# Patient Record
Sex: FEMALE | Race: WHITE | Employment: STUDENT | URBAN - METROPOLITAN AREA
[De-identification: names, ages, dates, MRNs, and addresses within clinical notes are randomized per-mention and may not be internally consistent; named-entity substitution may affect disease eponyms.]

---

## 2023-12-18 ENCOUNTER — OFFICE VISIT (OUTPATIENT)
Dept: OBGYN CLINIC | Facility: CLINIC | Age: 23
End: 2023-12-18
Payer: COMMERCIAL

## 2023-12-18 VITALS — SYSTOLIC BLOOD PRESSURE: 124 MMHG | HEIGHT: 66 IN | DIASTOLIC BLOOD PRESSURE: 92 MMHG

## 2023-12-18 DIAGNOSIS — N93.9 ABNORMAL UTERINE BLEEDING (AUB): Primary | ICD-10-CM

## 2023-12-18 DIAGNOSIS — M62.89 HIGH-TONE PELVIC FLOOR DYSFUNCTION IN FEMALE: ICD-10-CM

## 2023-12-18 DIAGNOSIS — N94.10 DYSPAREUNIA IN FEMALE: ICD-10-CM

## 2023-12-18 PROCEDURE — 87591 N.GONORRHOEAE DNA AMP PROB: CPT | Performed by: OBSTETRICS & GYNECOLOGY

## 2023-12-18 PROCEDURE — 87491 CHLMYD TRACH DNA AMP PROBE: CPT | Performed by: OBSTETRICS & GYNECOLOGY

## 2023-12-18 RX ORDER — ATOMOXETINE 80 MG/1
CAPSULE ORAL
COMMUNITY
Start: 2022-08-11

## 2023-12-18 RX ORDER — SPIRONOLACTONE 50 MG/1
1 TABLET, FILM COATED ORAL AS DIRECTED
COMMUNITY
Start: 2020-12-11

## 2023-12-18 RX ORDER — ALBUTEROL SULFATE 90 UG/1
2 AEROSOL, METERED RESPIRATORY (INHALATION) EVERY 4 HOURS PRN
COMMUNITY
Start: 2022-12-20

## 2023-12-18 RX ORDER — DULOXETIN HYDROCHLORIDE 60 MG/1
60 CAPSULE, DELAYED RELEASE ORAL DAILY
COMMUNITY
Start: 2022-08-01

## 2023-12-18 RX ORDER — ATOMOXETINE 40 MG/1
CAPSULE ORAL
COMMUNITY
Start: 2023-11-10

## 2023-12-18 NOTE — PROGRESS NOTES
New Patient GYN History and Physical  [facility]    CHIEF COMPLAINT:    Chief Complaint   Patient presents with    IUD     Pt states she got IUD inserted  or  wants to discuss the side effects, states she was bleeding up until yesterday and also placement of IUD      HISTORY OF PRESENT ILLNESS:   Isabel Farley is a 26year old female [facility]  who presents for    Iud check. Mirena inserted over the summer to address bleeding. Had bleeding that would last about 2 weeks (very heavy) friends would tell her that her face would go pale  Painful cramps, would soak through a super tampon in less than an hour. Started birth control pills as a hormone stabilizer - freshman year of college. Wsa 2 weeks on bleeding, 2 weeks og. These helped a little, but was still bleeding over a week and half, heavy for 5-6 days. Tried taking it continuously and was spotting. So would take the placebos every 3 months or so. And would bleeding for a month. So wanted to try an IUD. Has gained a bunch of weight - doesn't fit into any clothes (stomach and breasts also.)  Is still spotting a lot with bleeding and cramping, so feels like she needs something to catch blood all the time. Some pain with sex and bleeding after sex. This would happen before the IUD was there. First gyn retired, so another gyn inserted the IUD. Asked about sterilization options.         PAST MEDICAL HISTORY:   Past Medical History:   Diagnosis Date    Acne     ADHD     Depression         PAST SURGICAL HISTORY:   Past Surgical History:   Procedure Laterality Date    [place] teeth removed          PAST OB HISTORY:  OB History    Para Term  AB Living   0 0 0 0 0 0   SAB IAB Ectopic Multiple Live Births   0 0 0 0 0       CURRENT MEDICATIONS:      Current Outpatient Medications:     spironolactone 50 MG Oral Tab, Take 1 tablet (50 mg total) by mouth As Directed., Disp: , Rfl:     DULoxetine 60 MG Oral Cap DR Particles, Take 1 capsule (60 mg total) by mouth daily. , Disp: , Rfl:     Atomoxetine HCl 80 MG Oral Cap, , Disp: , Rfl:     atomoxetine 40 MG Oral Cap, TAKE 1 CAPSULE BY MOUTH EVERY DAY FOR 1 WEEK, THEN TAKE 2 CAPSULES DAILY, Disp: , Rfl:     albuterol 108 (90 Base) MCG/ACT Inhalation Aero Soln, Inhale 2 puffs into the lungs every 4 (four) hours as needed. , Disp: , Rfl:     Acetaminophen (MIDOL OR), Take by mouth As Directed., Disp: , Rfl:     ALLERGIES:  Allergies   Allergen Reactions    Penicillins HIVES and RASH       SOCIAL HISTORY:  Social History     Socioeconomic History    Marital status: Single   Tobacco Use    Smoking status: Never    Smokeless tobacco: Never   Substance and Sexual Activity    Alcohol use: Yes     Comment: socially couple times per month    Drug use: Never    Sexual activity: Yes     Birth control/protection: I.U.D. Other Topics Concern    Blood Transfusions No       FAMILY HISTORY:  Family History   Problem Relation Age of Onset    Other (myasthenia gravis) Father     Other (epithelial atypia in her breast) Mother     Uterine Cancer Maternal Grandmother     Pancreatic Cancer Maternal Grandmother     Breast Cancer Paternal Grandfather      ASSESSMENTS:  PHYSICAL EXAM:   No LMP recorded. (Menstrual status: IUD - Intrauterine Device).    Vitals:    12/18/23 1515   BP: (!) 124/92   Height: 66\"     CONSTITUTIONAL: Awake, alert, cooperative, no apparent distress, and appears stated age   NECK: Supple, symmetrical, trachea midline, no adenopathy, thyroid symmetric, not enlarged and no tenderness  LUNGS: No excess work of breathing  ABDOMEN: Soft, non-distended, non-tender, no masses palpated    GENITAL/URINARY:    External Genitalia:  General appearance; normal, Hair distribution; normal, Lesions absent   Urethral Meatus:  Lesions absent, Prolapse absent  Bladder:  Tenderness absent, Cystocele absent  Vagina:  Discharge absent, Lesions absent, Pelvic support normal; Strength 4/5 with poor relaxation, pain b/l OI worse with abduction R>L  Cervix:  Lesions absent, Discharge absent, Tenderness absent; IUD strings visible approx 1.5 cm  Uterus:  Size normal, Masses absent, Tenderness absent  Adnexa: Masses absent, Tenderness absent  Anus/Perineum:  Lesions absent    MUSCULOSKELETAL: There is no redness, warmth, or swelling of the joints. Tone is normal.  NEUROLOGIC: Patient is awake, alert and oriented to name, place and time. Casual gait is normal.  SKIN: no bruising or bleeding and no rashes  PSYCHIATRIC: Behavior:  Appropriate  Mood:  appropriate  ASSESSMENT AND PLAN:  1. Abnormal uterine bleeding (AUB)  - could simply be related to IUD. Might be how her body repsonds to hormonal treatment. Recommend US to eval endometrium for polyps/fibroids. And STI testing.  - Pelvic US Complete GYNE Only [84590/81432]; Future  - Chlamydia/Gc Amplification; Future  - Chlamydia/Gc Amplification    2. Dyspareunia in female  - likely related to high tone, however r/o STI.  - Chlamydia/Gc Amplification; Future  - Chlamydia/Gc Amplification    3. High-tone pelvic floor dysfunction in female  - Physical Therapy Referral - External  - Chlamydia/Gc Amplification;  Future  - Chlamydia/Gc Amplification     follow up as needed  Meagan Josue DO

## 2023-12-19 PROBLEM — F32.A DEPRESSION: Status: ACTIVE | Noted: 2023-12-19

## 2023-12-19 PROBLEM — F90.9 ADHD: Status: ACTIVE | Noted: 2023-12-19

## 2023-12-19 LAB
C TRACH DNA SPEC QL NAA+PROBE: NEGATIVE
N GONORRHOEA DNA SPEC QL NAA+PROBE: NEGATIVE

## 2023-12-20 ENCOUNTER — ULTRASOUND ENCOUNTER (OUTPATIENT)
Dept: OBGYN CLINIC | Facility: CLINIC | Age: 23
End: 2023-12-20
Payer: COMMERCIAL

## 2023-12-20 DIAGNOSIS — N93.9 ABNORMAL UTERINE BLEEDING (AUB): ICD-10-CM

## 2023-12-20 PROCEDURE — 76830 TRANSVAGINAL US NON-OB: CPT | Performed by: OBSTETRICS & GYNECOLOGY

## 2023-12-20 PROCEDURE — 76856 US EXAM PELVIC COMPLETE: CPT | Performed by: OBSTETRICS & GYNECOLOGY

## 2024-04-03 ENCOUNTER — PATIENT MESSAGE (OUTPATIENT)
Dept: OBGYN CLINIC | Facility: CLINIC | Age: 24
End: 2024-04-03

## 2024-04-03 DIAGNOSIS — N93.9 ABNORMAL UTERINE BLEEDING (AUB): Primary | ICD-10-CM

## 2024-04-03 NOTE — TELEPHONE ENCOUNTER
Jocelyn Dong, DO  12/21/2023  3:46 PM CST       Harshad Angulo,  The ultrasound shows a normal uterus with normal placed IUD.  The endometrial cavity appears normal with no polyp or fibroid. So the bleeding pattern is probably your body's response to the IUD.     There is a cyst in your right ovary, it is a bubble with some blood in it. These typically go away if we give them enough time, so I usually recommend repeating an ultrasound in 2-4 months to see if it has resolved or if it has gotten bigger.     Please call the office with any questions or concern [phone].  ~Dr. Dong         From: Adele Esposito  To: Jocelyn Dong  Sent: 4/3/2024  8:58 AM CDT  Subject: Constant bleeding/spotting    Hi Dr Dong,    I have been experiencing almost constant spotting/light bleeding for going on three weeks now. It’s seems a little like the beginning/end of a period with colored discharge and cramping and such. I also have noticed if I get aroused it gets heavier and cramping gets worse. I essentially have to use a tampon every day to make sure I don’t bleed onto anything. Do you have any advise? Could this be caused by the cyst we found on my ovary? What do you suggest moving forward? This is incredibly frustrating for me cause I feel like I’m in constant pain or fear of bleeding. I have an appointment scheduled with you this summer but wanted to reach out and get advise.     Kind regards,  Hilda Esposito

## 2024-04-05 NOTE — TELEPHONE ENCOUNTER
Reagan Angulo,      An order for pelvic ultrasound has been placed.  Please call Dr. Dong's office to schedule.     Any concerns or additional questions, please call us at .     Thanks,

## 2024-05-28 ENCOUNTER — HOSPITAL ENCOUNTER (OUTPATIENT)
Dept: ULTRASOUND IMAGING | Age: 24
Discharge: HOME | End: 2024-05-28
Attending: OBSTETRICS & GYNECOLOGY

## 2024-05-28 DIAGNOSIS — N93.9 ABNORMAL UTERINE BLEEDING: ICD-10-CM

## 2024-05-28 PROCEDURE — 76856 US EXAM PELVIC COMPLETE: CPT | Performed by: OBSTETRICS & GYNECOLOGY

## 2024-05-28 PROCEDURE — 76830 TRANSVAGINAL US NON-OB: CPT | Performed by: OBSTETRICS & GYNECOLOGY

## 2024-05-30 ENCOUNTER — OFFICE VISIT (OUTPATIENT)
Dept: OBGYN CLINIC | Facility: CLINIC | Age: 24
End: 2024-05-30

## 2024-05-30 VITALS
SYSTOLIC BLOOD PRESSURE: 110 MMHG | HEIGHT: 68 IN | DIASTOLIC BLOOD PRESSURE: 80 MMHG | BODY MASS INDEX: 28.95 KG/M2 | WEIGHT: 191 LBS

## 2024-05-30 DIAGNOSIS — Z30.432 ENCOUNTER FOR IUD REMOVAL: ICD-10-CM

## 2024-05-30 DIAGNOSIS — N83.201 RIGHT OVARIAN CYST: ICD-10-CM

## 2024-05-30 DIAGNOSIS — Z12.4 PAP SMEAR FOR CERVICAL CANCER SCREENING: ICD-10-CM

## 2024-05-30 DIAGNOSIS — Z97.5 BREAKTHROUGH BLEEDING ASSOCIATED WITH INTRAUTERINE DEVICE (IUD): ICD-10-CM

## 2024-05-30 DIAGNOSIS — N92.1 BREAKTHROUGH BLEEDING ASSOCIATED WITH INTRAUTERINE DEVICE (IUD): ICD-10-CM

## 2024-05-30 DIAGNOSIS — M62.89 HIGH-TONE PELVIC FLOOR DYSFUNCTION IN FEMALE: Primary | ICD-10-CM

## 2024-05-30 PROCEDURE — 99214 OFFICE O/P EST MOD 30 MIN: CPT | Performed by: OBSTETRICS & GYNECOLOGY

## 2024-05-30 PROCEDURE — 58301 REMOVE INTRAUTERINE DEVICE: CPT | Performed by: OBSTETRICS & GYNECOLOGY

## 2024-05-30 PROCEDURE — 88175 CYTOPATH C/V AUTO FLUID REDO: CPT | Performed by: OBSTETRICS & GYNECOLOGY

## 2024-05-30 RX ORDER — ALBUTEROL SULFATE 90 UG/1
2 AEROSOL, METERED RESPIRATORY (INHALATION) EVERY 4 HOURS PRN
Refills: 0 | Status: CANCELLED | OUTPATIENT
Start: 2024-05-30

## 2024-05-30 RX ORDER — TRANEXAMIC ACID 650 MG/1
1300 TABLET ORAL 3 TIMES DAILY
Qty: 30 TABLET | Refills: 10 | Status: SHIPPED | OUTPATIENT
Start: 2024-05-30

## 2024-05-30 NOTE — PROGRESS NOTES
RETURN GYN OFFICE VISIT  [facility]    CHIEF COMPLAINT:    Chief Complaint   Patient presents with    Menstrual Problem     Pt states she's on IUD and is bleeding and spotting for a month and a half and then will stop and start again. Pt states IUD inserted 2023       HISTORY OF PRESENT ILLNESS:    ALINA is a 24 year old female  here for   Abnormal bleeding    Spotting all the time - will stop for several days to a week, then start again.  Spotting starts after sex.    Before the IUD - was on OCP - having heavy bleeding with the pill  Was on marisol.    Needs pap smear.    Still having pain with sex, did not do PT we discussed last time.    Questions about ovarian cyst on her recent US.    REVIEW OF SYSTEMS:   CONSTITUTIONAL:  negative for fevers, chills, sweats and fatigue  GASTROINTESTINAL:  negative for nausea, vomiting, blood in stool, constipation, diarrhea and abdominal pain  GENITOURINARY: negative for no dysuria, urgency or frequency; no urinary incontinence; no hematuria  SKIN:  negative for  rash, skin lesion and pruritus  ENDOCRINE:  negative for acne, fatigue, weight gain and weight loss  BEHAVIOR/PSYCH:  negative for depressed mood, anhedonia and anxiety    CURRENT MEDICATIONS:      Current Outpatient Medications:     tranexamic acid 650 MG Oral Tab, Take 2 tablets (1,300 mg total) by mouth in the morning, at noon, and at bedtime., Disp: 30 tablet, Rfl: 10    DULoxetine 60 MG Oral Cap DR Particles, Take 1 capsule (60 mg total) by mouth daily., Disp: 90 capsule, Rfl: 3    atomoxetine 40 MG Oral Cap, 2 tabs once a day by mouth, Disp: 180 capsule, Rfl: 3    spironolactone 50 MG Oral Tab, Take 1 tablet (50 mg total) by mouth As Directed., Disp: , Rfl:     albuterol 108 (90 Base) MCG/ACT Inhalation Aero Soln, Inhale 2 puffs into the lungs every 4 (four) hours as needed., Disp: , Rfl:     Acetaminophen (MIDOL OR), Take by mouth As Directed. (Patient not taking: Reported on 2024), Disp: , Rfl:      PAST MEDICAL, SOCIAL AND FAMILY HISTORY:    Past Medical History:    Acne    ADHD    Depression     Past Surgical History:   Procedure Laterality Date    [place] teeth removed       Family History   Problem Relation Age of Onset    Other (myasthenia gravis) Father     Other (epithelial atypia in her breast) Mother     Uterine Cancer Maternal Grandmother     Pancreatic Cancer Maternal Grandmother     Breast Cancer Paternal Grandfather      Social History     Socioeconomic History    Marital status: Single   Tobacco Use    Smoking status: Never    Smokeless tobacco: Never   Vaping Use    Vaping status: Never Used   Substance and Sexual Activity    Alcohol use: Yes     Comment: socially couple times per month    Drug use: Never    Sexual activity: Yes     Birth control/protection: I.U.D.   Other Topics Concern    Blood Transfusions No           PHYSICAL EXAM:   No LMP recorded. (Menstrual status: IUD - Intrauterine Device).; Body mass index is 29.04 kg/m².      CONSTITUTIONAL:  Awake, alert, cooperative, no apparent distress  EYES: sclera clear and conjunctiva normal  GASTROINTESTINAL:  soft, non-distended, non-tender, no masses palpated  GENITAL/URINARY:    External Genitalia:  General appearance; normal, Hair distribution; normal, Lesions absent   Urethral Meatus:  Lesions absent, Prolapse absent  Bladder:  Tenderness absent, Cystocele absent  Vagina:  Discharge absent, Lesions absent, Pelvic support normal  Cervix:  Lesions absent, Discharge absent, Tenderness absent  Uterus:  Size normal, Masses absent, Tenderness absent  Adnexa:  Masses absent, Tenderness absent  Anus/Perineum:  Lesions absent    IUD strings grasped with uterine forceps and gentle traction was applied. The IUD was easily and completely removed.    SKIN:  No rashes  PSYCH:  Affect Normal      ASSESSMENT AND PLAN:  1. Breakthrough bleeding associated with intrauterine device (IUD)  - reviewed options: keep iud and add combined contraceptive,  remove IUD for \"hormone break\" for a bit and use TXA for heavy periods seveal months.  - if TXA works and she feels good / less bleeding, ok to continue - important to use condoms and/or VCA. If doesn't work, will need to figure some other option (another form of OCPs, patch, nuvaring, etc.)  - REMOVE INTRAUTERINE DEVICE [[id number]]    2. High-tone pelvic floor dysfunction in female  - Physical Therapy Referral - External    3. Pap smear for cervical cancer screening  - ThinPrep PAP with HPV Reflex Request; Future    4. Encounter for IUD removal  - REMOVE INTRAUTERINE DEVICE [[id number]]     5. Right ovarian cyst  - reviewed US findings. Is a different cyst than last time. No concern based on size / appearance and no need for specific f/u.        Jocelyn Dong, DO

## 2024-06-05 ENCOUNTER — LAB ENCOUNTER (OUTPATIENT)
Dept: LAB | Age: 24
End: 2024-06-05
Attending: FAMILY MEDICINE
Payer: COMMERCIAL

## 2024-06-05 ENCOUNTER — OFFICE VISIT (OUTPATIENT)
Dept: FAMILY MEDICINE CLINIC | Facility: CLINIC | Age: 24
End: 2024-06-05
Payer: COMMERCIAL

## 2024-06-05 VITALS
DIASTOLIC BLOOD PRESSURE: 79 MMHG | SYSTOLIC BLOOD PRESSURE: 115 MMHG | WEIGHT: 190.81 LBS | TEMPERATURE: 98 F | HEIGHT: 67.25 IN | HEART RATE: 88 BPM | BODY MASS INDEX: 29.6 KG/M2 | RESPIRATION RATE: 16 BRPM

## 2024-06-05 DIAGNOSIS — R09.81 NASAL CONGESTION: ICD-10-CM

## 2024-06-05 DIAGNOSIS — G89.29 CHRONIC PAIN OF LEFT ANKLE: ICD-10-CM

## 2024-06-05 DIAGNOSIS — F90.2 ATTENTION DEFICIT HYPERACTIVITY DISORDER (ADHD), COMBINED TYPE: ICD-10-CM

## 2024-06-05 DIAGNOSIS — Z00.00 ROUTINE PHYSICAL EXAMINATION: Primary | ICD-10-CM

## 2024-06-05 DIAGNOSIS — M25.572 CHRONIC PAIN OF LEFT ANKLE: ICD-10-CM

## 2024-06-05 DIAGNOSIS — F33.40 RECURRENT MAJOR DEPRESSIVE DISORDER, IN REMISSION (HCC): ICD-10-CM

## 2024-06-05 DIAGNOSIS — Z00.00 ROUTINE PHYSICAL EXAMINATION: ICD-10-CM

## 2024-06-05 LAB
ALBUMIN SERPL-MCNC: 4.9 G/DL (ref 3.2–4.8)
ALBUMIN/GLOB SERPL: 1.7 {RATIO} (ref 1–2)
ALP LIVER SERPL-CCNC: 106 U/L
ALT SERPL-CCNC: 8 U/L
ANION GAP SERPL CALC-SCNC: 9 MMOL/L (ref 0–18)
AST SERPL-CCNC: 16 U/L (ref ?–34)
BILIRUB SERPL-MCNC: 0.4 MG/DL (ref 0.3–1.2)
BUN BLD-MCNC: 9 MG/DL (ref 9–23)
BUN/CREAT SERPL: 11.1 (ref 10–20)
CALCIUM BLD-MCNC: 9.9 MG/DL (ref 8.7–10.4)
CHLORIDE SERPL-SCNC: 107 MMOL/L (ref 98–112)
CHOLEST SERPL-MCNC: 151 MG/DL (ref ?–200)
CO2 SERPL-SCNC: 25 MMOL/L (ref 21–32)
CREAT BLD-MCNC: 0.81 MG/DL
DEPRECATED RDW RBC AUTO: 41.8 FL (ref 35.1–46.3)
EGFRCR SERPLBLD CKD-EPI 2021: 105 ML/MIN/1.73M2 (ref 60–?)
ERYTHROCYTE [DISTWIDTH] IN BLOOD BY AUTOMATED COUNT: 13 % (ref 11–15)
EST. AVERAGE GLUCOSE BLD GHB EST-MCNC: 100 MG/DL (ref 68–126)
FASTING PATIENT LIPID ANSWER: YES
FASTING STATUS PATIENT QL REPORTED: YES
GLOBULIN PLAS-MCNC: 2.9 G/DL (ref 2–3.5)
GLUCOSE BLD-MCNC: 76 MG/DL (ref 70–99)
HBA1C MFR BLD: 5.1 % (ref ?–5.7)
HCT VFR BLD AUTO: 38.1 %
HDLC SERPL-MCNC: 45 MG/DL (ref 40–59)
HGB BLD-MCNC: 12.5 G/DL
LDLC SERPL CALC-MCNC: 92 MG/DL (ref ?–100)
MCH RBC QN AUTO: 29.2 PG (ref 26–34)
MCHC RBC AUTO-ENTMCNC: 32.8 G/DL (ref 31–37)
MCV RBC AUTO: 89 FL
NONHDLC SERPL-MCNC: 106 MG/DL (ref ?–130)
OSMOLALITY SERPL CALC.SUM OF ELEC: 289 MOSM/KG (ref 275–295)
PLATELET # BLD AUTO: 492 10(3)UL (ref 150–450)
POTASSIUM SERPL-SCNC: 4.4 MMOL/L (ref 3.5–5.1)
PROT SERPL-MCNC: 7.8 G/DL (ref 5.7–8.2)
RBC # BLD AUTO: 4.28 X10(6)UL
SODIUM SERPL-SCNC: 141 MMOL/L (ref 136–145)
TRIGL SERPL-MCNC: 72 MG/DL (ref 30–149)
TSI SER-ACNC: 1.2 MIU/ML (ref 0.55–4.78)
VIT D+METAB SERPL-MCNC: 29.6 NG/ML (ref 30–100)
VLDLC SERPL CALC-MCNC: 12 MG/DL (ref 0–30)
WBC # BLD AUTO: 8.2 X10(3) UL (ref 4–11)

## 2024-06-05 PROCEDURE — 83036 HEMOGLOBIN GLYCOSYLATED A1C: CPT | Performed by: FAMILY MEDICINE

## 2024-06-05 PROCEDURE — 82306 VITAMIN D 25 HYDROXY: CPT

## 2024-06-05 PROCEDURE — 80061 LIPID PANEL: CPT | Performed by: FAMILY MEDICINE

## 2024-06-05 PROCEDURE — 84443 ASSAY THYROID STIM HORMONE: CPT | Performed by: FAMILY MEDICINE

## 2024-06-05 PROCEDURE — 36415 COLL VENOUS BLD VENIPUNCTURE: CPT | Performed by: FAMILY MEDICINE

## 2024-06-05 PROCEDURE — 85027 COMPLETE CBC AUTOMATED: CPT | Performed by: FAMILY MEDICINE

## 2024-06-05 PROCEDURE — 99395 PREV VISIT EST AGE 18-39: CPT | Performed by: FAMILY MEDICINE

## 2024-06-05 PROCEDURE — 80053 COMPREHEN METABOLIC PANEL: CPT | Performed by: FAMILY MEDICINE

## 2024-06-05 RX ORDER — ALBUTEROL SULFATE 90 UG/1
2 AEROSOL, METERED RESPIRATORY (INHALATION) EVERY 4 HOURS PRN
Qty: 1 EACH | Refills: 3 | Status: SHIPPED | OUTPATIENT
Start: 2024-06-05

## 2024-06-05 RX ORDER — GLYCOPYRROLATE 2 MG/1
2 TABLET ORAL DAILY
COMMUNITY
Start: 2024-05-29

## 2024-06-05 RX ORDER — TACROLIMUS 0.3 MG/G
OINTMENT TOPICAL
COMMUNITY
Start: 2024-05-28

## 2024-06-05 NOTE — PROGRESS NOTES
HPI:  24 yr old female who presents for physical. Single.  No children.  Goes to [employer] [place]. Working full-time for co-op.  Majoring in mechanical engineering. Exercises regularly.  Eats healthy.     Has history of depression and ADHD. Was prescribed medication by Dr. Cano. Originally diagnosed about 2 years ago. Pt is on 60mg of Duloxetine.  Works well.  Also on Atomoxetine 40mg which works some of the time.  Had Genome study done which said stimulants would not be affected. Does not take it daily. Has psychologist.     Has chronic sweating.  On Glycopyrrolate. Started last night by Dermatologist.  Sees Derm for acne as well.     Sees Gynecology. Has heavy, irregular periods and is going to try tranexamic acid during periods.     No diagnosis of asthma but uses Albuterol for exercise.     Has been snore more recently.  Feels like she does not get enough air through nose. Feels congested chronically.  Gets sick about every 3 weeks. Takes Sudafed or Claritin.  Does not seem to help.     Left ankle hurts when she inverts it. No injury.  Does not feel like she has full range of motion.     PMHx: reviewed, see chart  PSHx: reviewed, see chart  All: Penicillins   Meds: see chart    ROS:   Allergic/Immuno:  Negative for environmental allergies and food allergies  Cardiovascular:  Negative for chest pain and irregular heartbeat/palpitations  Constitutional:  Negative for decreased activity, fever, irritability and lethargy  Endocrine:  Negative for abnormal sleep patterns, increased activity, polydipsia and polyphagia  ENMT:  Negative for ear drainage, negative for eye discharge and vision loss. Positive for nasal congestion, snoring.   Gastrointestinal:  Negative for abdominal pain, constipation, decreased appetite, diarrhea and vomiting  Genitourinary:  Negative for dysuria and hematuria  Hema/Lymph:  Negative for easy bleeding and easy bruising  Integumentary:  Negative for pruritus and  rash  Musculoskeletal:  chronic left ankle pain  Neurological:  Negative for gait disturbance  Psychiatric:  Negative for inappropriate interaction and psychiatric symptoms    PE:  /79   Pulse 88   Temp 98.4 °F (36.9 °C) (Temporal)   Resp 16   Ht 5' 7.25\" (1.708 m)   Wt 190 lb 12.8 oz (86.5 kg)   LMP 06/03/2024   BMI 29.66 kg/m²   Gen:  Well-nourished.  No distress.  HEENT: Conjunctive clear.  Davy ear canals clear.  Davy TMs intact with good landmarks noted. Oral mucous membrane moist.  Normal lips, teeth, and gums.  Oropharynx normal.  Neck supple.  Good ROM.  No LAD.  Thyroid normal.  CV:  Regular rate and rhythm; no murmurs  Lungs:  Clear to ausculation; good aeration               No wheezes, rales or rhonchi  Abd: soft, non-tender, non-distended          Normal bowel sounds; no masses          No hepatosplenomegaly  Breasts:  Normal appearance bilateral.  No masses or lesions noted.  Normal nipples bilateral.  No nipple discharge noted.  Normal lymph nodes.  Extremities: No cyanosis, clubbing, edema.  Pedal pulses 2+ davy.  MSK:  No abnormalities.  Skin: pyogenic granuloma noted to left earlobe    A/P:  Encounter Diagnoses   Name Primary?    Routine physical examination    Encouraged exercise and healthy diet.  Follows with Derm and Gyne. Labs done.    Yes    Recurrent major depressive disorder, in remission (HCC)    Stable on Duloxetine.  Will refill.        Attention deficit hyperactivity disorder (ADHD), combined type    Stable on Atomoxetine.  Will refill and see patient every 6 months.        Nasal congestion    Worsening.  Advised daily anti-histamine and Flonase.  Refer to ENT       Chronic pain of left ankle    Refer for physical therapy.         Colleen Weiler, DO

## 2024-06-20 ENCOUNTER — OFFICE VISIT (OUTPATIENT)
Dept: OTOLARYNGOLOGY | Facility: CLINIC | Age: 24
End: 2024-06-20

## 2024-06-20 DIAGNOSIS — R40.0 DAYTIME SLEEPINESS: ICD-10-CM

## 2024-06-20 DIAGNOSIS — R06.83 SNORING: Primary | ICD-10-CM

## 2024-06-20 PROCEDURE — 99203 OFFICE O/P NEW LOW 30 MIN: CPT | Performed by: STUDENT IN AN ORGANIZED HEALTH CARE EDUCATION/TRAINING PROGRAM

## 2024-06-20 PROCEDURE — 31231 NASAL ENDOSCOPY DX: CPT | Performed by: STUDENT IN AN ORGANIZED HEALTH CARE EDUCATION/TRAINING PROGRAM

## 2024-06-20 RX ORDER — AZELASTINE 1 MG/ML
1 SPRAY, METERED NASAL 2 TIMES DAILY
Qty: 30 ML | Refills: 3 | Status: SHIPPED | OUTPATIENT
Start: 2024-06-20

## 2024-06-20 RX ORDER — CETIRIZINE HYDROCHLORIDE 1 MG/ML
5 SOLUTION ORAL DAILY
COMMUNITY

## 2024-06-20 NOTE — PROGRESS NOTES
[facility]  OTOLARYNGOLOGY - HEAD & NECK SURGERY    6/20/2024     Reason for Consultation:   Nasal congestion, snoring    History of Present Illness:   Patient is a pleasant 24 year old female who is being seen for snoring which she states has gotten worse recently.  She is also been having difficulty with breathing through the nose for many years.  She states that she has been getting sick every 2 to 3 weeks.  She has not been tested for allergies but states that she does have allergy symptoms.  She is currently taking Xyzal, and Flonase once daily.  She has not been evaluated by an allergist previously.  She has not had any previous sleep study.  She does state that she has daytime sleepiness and that her snoring does tend to wake her up at night.  She has had no history of nasal surgery.  She denies any facial pressure or pain in between her illnesses.    Past Medical History  Past Medical History:    Acne    ADHD    Depression       Past Surgical History  Past Surgical History:   Procedure Laterality Date    [place] teeth removed         Family History  Family History   Problem Relation Age of Onset    Other (myasthenia gravis) Father     Other (epithelial atypia in her breast) Mother     Uterine Cancer Maternal Grandmother     Pancreatic Cancer Maternal Grandmother     Breast Cancer Paternal Grandfather        Social History  Pediatric History   Patient Parents    Nany Esposito (Mother)     Other Topics Concern     Service Not Asked    Blood Transfusions No    Caffeine Concern Not Asked    Occupational Exposure Not Asked    Hobby Hazards Not Asked    Sleep Concern Not Asked    Stress Concern Not Asked    Weight Concern Not Asked    Special Diet Not Asked    Back Care Not Asked    Exercise Not Asked    Bike Helmet Not Asked    Seat Belt Not Asked    Self-Exams Not Asked   Social History Narrative    Not on file           Current Medications:  Current Outpatient Medications   Medication Sig Dispense Refill     cetirizine 1 MG/ML Oral Solution Take 5 mL (5 mg total) by mouth daily.      azelastine 0.1 % Nasal Solution 1 spray by Nasal route 2 (two) times daily. 30 mL 3    glycopyrrolate 2 MG Oral Tab Take 1 tablet (2 mg total) by mouth daily. (Patient not taking: Reported on 6/20/2024)      Tacrolimus 0.03 % External Ointment APPLY TO EYE DERMATITIS TWICE A DAY X ONE MONTH (Patient not taking: Reported on 6/20/2024)      albuterol 108 (90 Base) MCG/ACT Inhalation Aero Soln Inhale 2 puffs into the lungs every 4 (four) hours as needed. (Patient not taking: Reported on 6/20/2024) 1 each 3    tranexamic acid 650 MG Oral Tab Take 2 tablets (1,300 mg total) by mouth in the morning, at noon, and at bedtime. (Patient not taking: Reported on 6/20/2024) 30 tablet 10    DULoxetine 60 MG Oral Cap DR Particles Take 1 capsule (60 mg total) by mouth daily. (Patient not taking: Reported on 6/20/2024) 90 capsule 3    atomoxetine 40 MG Oral Cap 2 tabs once a day by mouth (Patient not taking: Reported on 6/20/2024) 180 capsule 3    spironolactone 50 MG Oral Tab Take 1 tablet (50 mg total) by mouth As Directed. (Patient not taking: Reported on 6/20/2024)      Acetaminophen (MIDOL OR) Take by mouth As Directed. (Patient not taking: Reported on 6/20/2024)         Allergies  Allergies   Allergen Reactions    Penicillins HIVES and RASH       Review of Systems:   A comprehensive 10 point review of systems was completed.  Pertinent positives and negatives noted in the the HPI.    Physical Exam:   Last menstrual period 06/03/2024.    GENERAL: No acute distress, Comfortable appearing  FACE: HB 1/6, Normal Animation  HEAD: Normocephalic  EYES: EOMI, pupils equil  EARS: Bilateral Auricles Symmetric  NOSE: Nares patent bilaterally  ORAL CAVITY: Tongue mobile, Oropharynx clear, Floor of mouth clear, Posterior oropharynx normal  NECK: No palpable lymphadenopathy, thyroid not palpable, nontender    PROCEDURE: BILATERAL RIGID NASAL ENDOSCOPY  Bilateral  rigid nasal endoscopy (46785) was performed. Verbal consent was obtained from the patient to proceed with rigid nasal endoscopy. The nasal cavity was decongested and topically anesthetized with a combination of Oxymetazoline and 4% Lidocaine. A rigid 4mm 30 degree nasal endoscope connected to a high-definition endoscopy system was used to examine both nasal cavities. Digital photos and/or videos of relevant exam findings were obtained. The inferior meatus, inferior turbinate, nasopharynx, middle meatus, middle turbinate, superior meatus, superior turbinate, and sphenoethmoidal recess were examined bilaterally and deemed to be normal, with any exceptions as noted below. At the completion of the procedure the endoscope was removed. The patient tolerated the procedure well. There were no complications.    Findings: The bilateral inferior turbinates were enlarged. The Septum was deviated to the right. The middle meatus was patent bilaterally with no obvious pus drainage. There were no obvious masses or polyps noted.      Results:     Laboratory Data:  Lab Results   Component Value Date    WBC 8.2 06/05/2024    HGB 12.5 06/05/2024    HCT 38.1 06/05/2024    .0 (H) 06/05/2024    CREATSERUM 0.81 06/05/2024    BUN 9 06/05/2024     06/05/2024    K 4.4 06/05/2024     06/05/2024    CO2 25.0 06/05/2024    GLU 76 06/05/2024    CA 9.9 06/05/2024    ALB 4.9 (H) 06/05/2024    ALKPHO 106 06/05/2024    TP 7.8 06/05/2024    AST 16 06/05/2024    ALT 8 (L) 06/05/2024    TSH 1.204 06/05/2024         Imaging:  US PELVIS W EV (CPT=76856/84423)    Result Date: 5/29/2024  PROCEDURE: US PELVIS W EV (CPT=76856/79656)  COMPARISON: None.  INDICATIONS: Abnormal uterine bleeding  TECHNIQUE: Pelvic ultrasound using transabdominal and transvaginal technique.  A transvaginal scan was performed for endometrial and adnexal evaluation  FINDINGS:   UTERUS:   Retroverted uterus Measures 6.5 x 3.7 x 5.5 cm  ENDOMETRIUM: Endometrium  measures 7.6 mm.  IUD in satisfactory position within the endometrial canal MYOMETRIUM: Normal echogenicity.  No masses.   OVARIES AND ADNEXA:   RIGHT:   Measures 3.8 x 3.6 x 4.1 cm.  Dominant benign anechoic cyst measuring 3.0 x 2.8 cm.  Normal right ovarian Doppler without torsion.  LEFT:   Measures 2.8 x 1.3 x 0.9 cm.  Small subcentimeter involuting cyst  CUL-DE-SAC:   Small amount of free fluid the cul-de-sac presumed physiologic. OTHER: Negative.  Bladder appears normal.          CONCLUSION:   1. Normal retroverted uterus.  No focal intrinsic lesion. 2. Normal thickness endometrium measures 7.6 mm.  IUD in satisfactory position in the endometrium 3. Normal appearing bilateral ovaries.  Dominant 3.0 x 2.8 cm benign follicular cyst right ovary.  Normal right ovarian Doppler.  Partially involuting left ovarian cyst measures less than 1 cm. 4. Smaller free pelvic fluid presumed physiologic   Dictated by (CST): Bernard Ryan MD on 5/29/2024 at 9:27 AM     Finalized by (CST): Bernard Ryan MD on 5/29/2024 at 9:34 AM              Impression:   Allergic rhinitis  Deviated nasal septum  Bilateral inferior turbinate hypertrophy  Snoring    Recommendations:  I would like her to obtain a sleep study, and I will reach out to her with the results.  In terms of her nasal congestion I would like to start her on azelastine nasal spray to add to her Flonase regiment.  She will do 1 spray in each nostril twice daily with each spray.  We discussed septoplasty and turbinate reduction as an option if she continues to have nasal congestion despite nasal spray use    Thank you for allowing me to participate in the care of your patient.    Geraldo Ybarra,    Otolaryngology/Rhinology, Sinus, and Endoscopic Skull Base Surgery  [address] [place] [id number]  Phone [phone]  Fax [phone]  6/20/2024  8:19 AM  6/20/2024

## 2024-06-21 RX ORDER — TRANEXAMIC ACID 650 MG/1
1300 TABLET ORAL 3 TIMES DAILY
Qty: 30 TABLET | Refills: 10 | OUTPATIENT
Start: 2024-06-21

## 2024-06-23 ENCOUNTER — PATIENT MESSAGE (OUTPATIENT)
Dept: OTOLARYNGOLOGY | Facility: CLINIC | Age: 24
End: 2024-06-23

## 2024-06-24 NOTE — TELEPHONE ENCOUNTER
From: Adele Esposiot  To: Geraldo Ybarra  Sent: 6/23/2024 10:35 PM CDT  Subject: Quick update and questions.     Hi! I’ve tried to take the azrlastine hcl spray and have had visceral reactions to it. I’ve felt like I would be sick every time I tasted it as it ran down the back of my throat (it lasted up to an hour) so I don’t think that’s a great option for me. I’ve tried it a few times and have had the same reaction. I’m still taking Flonase but is there anything else I can try?     Next I have a few more questions regarding the septoplasty procedures if that is the course of action we think is best.     - I have piercings (ear, nose, and body). For this procedure do I have to take all of them out?   - After I get the stints out, how soon can I fly/travel?  - How likely is this to chip cure my issues?  - Do I go under general anesthesia or is it local numbing and I am awake? Can I choose?  - Will this likely change how often I get sick/how severe allergies are?  - Will I need to stop taking any medications for the surgery? If I’m on my cycle can I still take tranexamic acid meds?  - Would I stop doing the nasal sprays and oral meds once if I got this surgery?

## 2024-06-24 NOTE — TELEPHONE ENCOUNTER
Dr. Ybarra, see patient's message about Azelastine side effects and questions regarding septoplasty.

## 2024-06-26 ENCOUNTER — TELEPHONE (OUTPATIENT)
Dept: OBGYN CLINIC | Facility: CLINIC | Age: 24
End: 2024-06-26

## 2024-06-26 NOTE — TELEPHONE ENCOUNTER
Spoke with patient in depth regarding her concerns with how it was billed. Explained to pt it was performed at the lab and not at our office so the price and how its billed through the insurance is different despite being located at [address] [place] [address].     Provided her the number for hospital billing to see if financial aid can help assist her

## 2024-06-26 NOTE — TELEPHONE ENCOUNTER
Received a call from patient requesting to change  the coding for her ultrasound completed on 05/28/202 at [place] [address] .  Patient noted the insurance is billing more and the coding needed to be changed to reflect that she conducted the ultrasound in the office rather than on the hospital's radiology side.    This PSR inform the patient that she  schedule  the ultrasound  using her My chart but patient insist she call for the appointment .    Patient requested to speak with a manager to see if she can help .    Please advice .

## 2024-10-27 ENCOUNTER — PATIENT MESSAGE (OUTPATIENT)
Dept: FAMILY MEDICINE CLINIC | Facility: CLINIC | Age: 24
End: 2024-10-27

## 2024-11-25 DIAGNOSIS — F32.5 MAJOR DEPRESSIVE DISORDER IN FULL REMISSION, UNSPECIFIED WHETHER RECURRENT (HCC): ICD-10-CM

## 2024-11-28 RX ORDER — DULOXETIN HYDROCHLORIDE 60 MG/1
60 CAPSULE, DELAYED RELEASE ORAL DAILY
Qty: 90 CAPSULE | Refills: 3 | Status: SHIPPED | OUTPATIENT
Start: 2024-11-28

## 2024-11-28 NOTE — TELEPHONE ENCOUNTER
Refill passed per [facility] protocol.     Requested Prescriptions   Pending Prescriptions Disp Refills    DULoxetine 60 MG Oral Cap DR Particles 90 capsule 3     Sig: Take 1 capsule (60 mg total) by mouth daily.       Psychiatric Non-Scheduled (Anti-Anxiety) Passed - 11/28/2024  8:06 AM        Passed - In person appointment or virtual visit in the past 6 mos or appointment in next 3 mos     Recent Outpatient Visits              5 months ago Snoring    [facility] Geraldo Ybarra,     Office Visit    5 months ago Routine physical examination    [facility] Weiler, Colleen M, DO    Office Visit    6 months ago High-tone pelvic floor dysfunction in female    [facility] - OB/GYN Jocelyn Dong,     Office Visit    11 months ago Attention deficit hyperactivity disorder (ADHD), predominantly inattentive type    [facility] Courtney Cano MD    Office Visit    11 months ago Abnormal uterine bleeding (AUB)    [facility] - OB/GYN Jocelyn Dong, DO    Office Visit          Future Appointments         Provider Department Appt Notes    In 4 days Jocelyn Dong, DO [facility] - OB/GYN Birth control    In 4 days Weiler, Colleen M, DO [facility] Medicines                    Passed - Depression Screening completed within the past 12 months

## 2024-12-02 ENCOUNTER — OFFICE VISIT (OUTPATIENT)
Dept: OBGYN CLINIC | Facility: CLINIC | Age: 24
End: 2024-12-02
Payer: COMMERCIAL

## 2024-12-02 VITALS — SYSTOLIC BLOOD PRESSURE: 106 MMHG | DIASTOLIC BLOOD PRESSURE: 78 MMHG

## 2024-12-02 DIAGNOSIS — Z30.09 ENCOUNTER FOR COUNSELING REGARDING CONTRACEPTION: Primary | ICD-10-CM

## 2024-12-02 RX ORDER — AZELAIC ACID 0.15 G/G
GEL TOPICAL
COMMUNITY
Start: 2024-11-09

## 2024-12-02 RX ORDER — CLINDAMYCIN PHOSPHATE 10 UG/ML
LOTION TOPICAL
COMMUNITY
Start: 2024-11-09

## 2024-12-02 RX ORDER — NORETHINDRONE ACETATE AND ETHINYL ESTRADIOL 1MG-20(21)
1 KIT ORAL DAILY
Qty: 84 TABLET | Refills: 3 | Status: SHIPPED | OUTPATIENT
Start: 2024-12-02 | End: 2025-12-02

## 2024-12-02 NOTE — PROGRESS NOTES
RETURN GYN OFFICE VISIT  [facility]    CHIEF COMPLAINT:    Chief Complaint   Patient presents with    Contraception     Pt wants to f/u     Consult     Tubal ligation        HISTORY OF PRESENT ILLNESS:    ALINA is a 24 year old female  here for   F/u on birth control    Periods feel more evened out.  Haven't been super heavy, some are heavy but not like she used to have.  If lasts over a week, uses the TXA to stop the bleeding.    Is hoping to start birth control.  Was on generic marisol - was still bleeding a lot while on it.  Was skipping the placebos but would spot for a long time.  Also gained a lot of weight on OCPs and on IUD in the past - is concerned this could happen again is starting hormonal contraception.      REVIEW OF SYSTEMS:   CONSTITUTIONAL:  negative for fevers, chills, sweats and fatigue  GASTROINTESTINAL:  negative for nausea, vomiting, blood in stool, constipation, diarrhea and abdominal pain  GENITOURINARY: negative for no dysuria, urgency or frequency; no urinary incontinence; no hematuria  SKIN:  negative for  rash, skin lesion and pruritus  ENDOCRINE:  negative for acne, fatigue, weight gain and weight loss  BEHAVIOR/PSYCH:  negative for depressed mood, anhedonia and anxiety    CURRENT MEDICATIONS:      Current Outpatient Medications:     Azelaic Acid 15 % External Gel, APPLY TO CLEAN FACE DRY FACE DAILY AT BEDTIME [phone], Disp: , Rfl:     clindamycin 1 % External Lotion, APPLY TO CLEAN FACE EVERY MORNING, Disp: , Rfl:     Norethin Ace-Eth Estrad-FE (LOESTRIN FE ) 1-20 MG-MCG Oral Tab, Take 1 tablet by mouth daily., Disp: 84 tablet, Rfl: 3    DULoxetine 60 MG Oral Cap DR Particles, Take 1 capsule (60 mg total) by mouth daily., Disp: 90 capsule, Rfl: 3    cetirizine 1 MG/ML Oral Solution, Take 5 mL (5 mg total) by mouth daily. (Patient not taking: Reported on 2024), Disp: , Rfl:     azelastine 0.1 % Nasal Solution, 1 spray by Nasal route 2 (two) times daily. (Patient  not taking: Reported on 12/2/2024), Disp: 30 mL, Rfl: 3    atomoxetine 40 MG Oral Cap, 2 tabs once a day by mouth, Disp: 180 capsule, Rfl: 3    spironolactone 50 MG Oral Tab, Take 1 tablet (50 mg total) by mouth As Directed., Disp: , Rfl:     glycopyrrolate 2 MG Oral Tab, Take 1 tablet (2 mg total) by mouth daily. (Patient not taking: Reported on 6/20/2024), Disp: , Rfl:     Tacrolimus 0.03 % External Ointment, , Disp: , Rfl:     albuterol 108 (90 Base) MCG/ACT Inhalation Aero Soln, Inhale 2 puffs into the lungs every 4 (four) hours as needed., Disp: 1 each, Rfl: 3    tranexamic acid 650 MG Oral Tab, Take 2 tablets (1,300 mg total) by mouth in the morning, at noon, and at bedtime., Disp: 30 tablet, Rfl: 10    PAST MEDICAL, SOCIAL AND FAMILY HISTORY:    Past Medical History:    Acne    ADHD    Anxiety    Depression     Past Surgical History:   Procedure Laterality Date    Insert intrauterine device      Remove intrauterine device      [place] teeth removed       Family History   Problem Relation Age of Onset    Other (myasthenia gravis) Father     Other (epithelial atypia in her breast) Mother     Uterine Cancer Maternal Grandmother     Pancreatic Cancer Maternal Grandmother     Breast Cancer Paternal Grandfather      Social History     Socioeconomic History    Marital status: Single   Tobacco Use    Smoking status: Never    Smokeless tobacco: Never   Vaping Use    Vaping status: Never Used   Substance and Sexual Activity    Alcohol use: Yes     Comment: I drink socially so i dont do it every week    Drug use: Never    Sexual activity: Yes     Birth control/protection: I.U.D.   Other Topics Concern    Blood Transfusions No    Caffeine Concern No    Special Diet No    Exercise Yes    Seat Belt Yes           PHYSICAL EXAM:   Patient's last menstrual period was 11/29/2024.; There is no height or weight on file to calculate BMI.      CONSTITUTIONAL:  Awake, alert, cooperative, no apparent distress  EYES: sclera clear and  conjunctiva normal  GASTROINTESTINAL:  soft, non-distended, non-tender, no masses palpated  SKIN:  No rashes  PSYCH:  Affect Normal      ASSESSMENT AND PLAN:  1. Encounter for counseling regarding contraception  - will try loestrin. Rx sent, ok to stop if noticing weight gain, then will have to decide if wants to try another formulation or consider sterilization.  - Talked about sterilization again - not ready for it, but is still considering this as an option, especially if OCPs don't work for her.      Medications    Norethin Ace-Eth Estrad-FE (LOESTRIN FE 1/20) 1-20 MG-MCG Oral Tab     Total face to face time was 20 minutes, more than 50% of the time was spent in counseling and/or coordination of care related to above discussions..      Jocelyn Dong, DO

## 2025-01-21 ENCOUNTER — TELEPHONE (OUTPATIENT)
Dept: SLEEP CENTER | Age: 25
End: 2025-01-21

## 2025-02-03 ENCOUNTER — PATIENT MESSAGE (OUTPATIENT)
Dept: FAMILY MEDICINE CLINIC | Facility: CLINIC | Age: 25
End: 2025-02-03

## 2025-02-04 NOTE — TELEPHONE ENCOUNTER
Please see my chart. Last office visit 12/2/2024. Did not pend due to no options for 90 mgs unless we change her 60 mg daily and have her take 30 mg one in the evening.    Please advise, thanks

## 2025-02-05 RX ORDER — DULOXETIN HYDROCHLORIDE 30 MG/1
30 CAPSULE, DELAYED RELEASE ORAL DAILY
Qty: 90 CAPSULE | Refills: 0 | Status: SHIPPED | OUTPATIENT
Start: 2025-02-05

## 2025-02-05 NOTE — TELEPHONE ENCOUNTER
Usually, when we increase to 90mg, the patients take a 60mg tab and a 30mg tab daily.  Can take them at the same time. Please ask her to follow-up over Spring break

## 2025-02-06 NOTE — TELEPHONE ENCOUNTER
Dr. Weiler please advise, the patient will not be home for spring break and is asking if she can do a video call.

## 2025-02-19 ENCOUNTER — TELEPHONE (OUTPATIENT)
Dept: SLEEP CENTER | Age: 25
End: 2025-02-19

## 2025-04-15 DIAGNOSIS — F32.5 MAJOR DEPRESSIVE DISORDER IN FULL REMISSION, UNSPECIFIED WHETHER RECURRENT: ICD-10-CM

## 2025-04-15 RX ORDER — NORETHINDRONE ACETATE AND ETHINYL ESTRADIOL AND FERROUS FUMARATE 1MG-20(21)
1 KIT ORAL DAILY
Qty: 84 TABLET | Refills: 3 | OUTPATIENT
Start: 2025-04-15 | End: 2026-04-15

## 2025-04-15 RX ORDER — DULOXETIN HYDROCHLORIDE 60 MG/1
60 CAPSULE, DELAYED RELEASE ORAL DAILY
Qty: 90 CAPSULE | Refills: 3 | Status: CANCELLED | OUTPATIENT
Start: 2025-04-15

## 2025-04-18 NOTE — TELEPHONE ENCOUNTER
[facility] Message sent to patient with transfer instructions. Year supply of refills good until 11/2025   Outpatient Medication Detail   Disp Refills Start End    DULoxetine 60 MG Oral Cap DR Particles 90 capsule 3 11/28/2024 --    Sig - Route: Take 1 capsule (60 mg total) by mouth daily. - Oral    Sent to pharmacy as: DULoxetine HCl 60 MG Oral Capsule Delayed Release Particles (Cymbalta)    E-Prescribing Status: Receipt confirmed by pharmacy (11/28/2024  8:07 AM CST)    Associated Diagnoses  Major depressive disorder in full remission, unspecified whether recurrent      Pharmacy  [id number] - [facility] [address] AT [facility], [phone], [phone]

## 2025-04-18 NOTE — TELEPHONE ENCOUNTER
Please review; protocol failed/ has no protocol      Medications listed as patient reported.        Please see patients Tuckerhart Brandon Esposito  YOLANDA [facility] Refills (supporting Colleen M Weiler, DO)3 days ago       Refills have been requested for the following medications:         spironolactone 50 MG Oral Tab      Patient Comment: I take 100MG a day of this. Coukd this be written on the prescription so I get a 90 day supply?         glycopyrrolate 2 MG Oral Tab         DULoxetine 30 MG Oral Cap DR Particles [Colleen Weiler]      Patient Comment: Can we combine the 30 and 60 MG DULoxetine into a 90MG pill?

## 2025-04-24 RX ORDER — DULOXETIN HYDROCHLORIDE 30 MG/1
30 CAPSULE, DELAYED RELEASE ORAL DAILY
Qty: 90 CAPSULE | Refills: 1 | Status: SHIPPED | OUTPATIENT
Start: 2025-04-24

## 2025-04-24 RX ORDER — GLYCOPYRROLATE 2 MG/1
2 TABLET ORAL DAILY
Qty: 90 TABLET | Refills: 1 | Status: SHIPPED | OUTPATIENT
Start: 2025-04-24

## 2025-04-24 RX ORDER — SPIRONOLACTONE 50 MG/1
100 TABLET, FILM COATED ORAL DAILY
Qty: 90 TABLET | Refills: 1 | Status: SHIPPED | OUTPATIENT
Start: 2025-04-24

## (undated) NOTE — LETTER
Adele Esposito, :2000    CONSENT FOR PROCEDURE/SEDATION    1. I authorize the performance upon Adele Esposito  the following: Intrauterine Device Removal    2. I authorize Dr. Jocelyn Dong,  (and whomever is designated as the doctor’s assistant), to perform the above-mentioned procedures.    3. If any unforeseen conditions arise during this procedure calling for additional  procedures, operations, or medications (including anesthesia and blood transfusion), I further request and authorize the doctor to do whatever he/she deems advisable in my interest.    4. I consent to the taking and reproduction of any photographs in the course of this procedure for professional purposes.    5. I consent to the administration of such sedation as may be considered necessary or advisable by the physician responsible for this service, with the exception of ______________________________________________________    6. I have been informed by my doctor of the nature and purpose of this procedure sedation, possible alternative methods of treatment, risk involved and possible complications.        Signature of Patient:_______________________________________________    Signature of person authorized to consent for patient:  _______________________________________________________________    Relationship to patient: ____________________________________________    Witness: _________________________________________ Date:___________     Physician Signature: _______________________________ Date:___________

## (undated) NOTE — MR AVS SNAPSHOT
After Visit Summary   5/30/2024    Adele Esposito   MRN: [id number]           Visit Information     Date & Time  5/30/2024  9:45 AM Provider  Jocelyn Dong DO Department  [facility] - OB/GYN Dept. Phone  [phone]      Your Vitals Were  Most recent update: 5/30/2024  9:45 AM    BP   110/80    Ht   68\"    Wt   191 lb    BMI   29.04 kg/m²          Allergies as of 5/30/2024  Review status set to Review Complete on 5/30/2024       Noted Reaction Type Reactions    Penicillins 08/16/2016    HIVES, RASH      Your Current Medications        Dosage    tranexamic acid 650 MG Oral Tab Take 2 tablets (1,300 mg total) by mouth in the morning, at noon, and at bedtime.    DULoxetine 60 MG Oral Cap DR Particles Take 1 capsule (60 mg total) by mouth daily.    atomoxetine 40 MG Oral Cap 2 tabs once a day by mouth    spironolactone 50 MG Oral Tab Take 1 tablet (50 mg total) by mouth As Directed.    albuterol 108 (90 Base) MCG/ACT Inhalation Aero Soln Inhale 2 puffs into the lungs every 4 (four) hours as needed.    Acetaminophen (MIDOL OR) Take by mouth As Directed.      Diagnoses for This Visit    High-tone pelvic floor dysfunction in female   [4073538]  -  Primary  Pap smear for cervical cancer screening   [664970]    Encounter for IUD removal   [398657]    Breakthrough bleeding associated with intrauterine device (IUD)   [0048505]    Right ovarian cyst   [698574]             Follow-up    Return in about 3 months (around 8/30/2024), or if symptoms worsen or fail to improve.     We Ordered the Following     Normal Orders This Visit    Physical Therapy Referral - External [756406 CPT(R)]     REMOVE INTRAUTERINE DEVICE [66299] [53271 CPT(R)]     ThinPrep PAP with HPV Reflex Request [[id number] CUSTOM]     ThinPrep PAP with HPV Reflex Request [[id number] CUSTOM]     Future Labs/Procedures Expected by Expires    ThinPrep PAP with HPV Reflex Request [[id number] CUSTOM]  5/30/2024 5/30/2025       Future Appointments        Provider Department    6/5/2024 9:30 AM Weiler, Colleen M [facility]      Follow-up Instructions    Return in about 3 months (around 8/30/2024), or if symptoms worsen or fail to improve.                  Did you know that [facility] [place] now offer Video Visits through [employer] for adult patients for a variety of conditions such as allergies, back pain and cold symptoms? Skip the drive and waiting room and online chat with a doctor face-to-face using your web-cam enabled computer or mobile device wherever you are. Video Visits cost $50 and can be paid hassle-free using a credit, debit, or health savings card.  Not active on [employer]? Ask us how to get signed up today!          If you receive a survey from [employer], please take a few minutes to complete it and provide feedback. We strive to deliver the best patient experience and are looking for ways to make improvements. Your feedback will help us do so. For more information on [employer], please visit [email or web address]           No text in SmartText           No text in SmartText